# Patient Record
Sex: MALE | Race: WHITE | URBAN - METROPOLITAN AREA
[De-identification: names, ages, dates, MRNs, and addresses within clinical notes are randomized per-mention and may not be internally consistent; named-entity substitution may affect disease eponyms.]

---

## 2024-02-24 ENCOUNTER — OFFICE VISIT (OUTPATIENT)
Age: 54
End: 2024-02-24

## 2024-02-24 VITALS
SYSTOLIC BLOOD PRESSURE: 139 MMHG | HEART RATE: 79 BPM | HEIGHT: 68 IN | WEIGHT: 196.6 LBS | TEMPERATURE: 98 F | DIASTOLIC BLOOD PRESSURE: 95 MMHG | OXYGEN SATURATION: 97 % | BODY MASS INDEX: 29.8 KG/M2 | RESPIRATION RATE: 18 BRPM

## 2024-02-24 DIAGNOSIS — I10 ELEVATED BLOOD PRESSURE READING WITH DIAGNOSIS OF HYPERTENSION: ICD-10-CM

## 2024-02-24 DIAGNOSIS — J40 BRONCHITIS: Primary | ICD-10-CM

## 2024-02-24 DIAGNOSIS — R05.1 ACUTE COUGH: ICD-10-CM

## 2024-02-24 RX ORDER — PREDNISONE 20 MG/1
20 TABLET ORAL 2 TIMES DAILY
Qty: 10 TABLET | Refills: 0 | Status: SHIPPED | OUTPATIENT
Start: 2024-02-24 | End: 2024-02-29

## 2024-02-24 RX ORDER — LISINOPRIL 10 MG/1
10 TABLET ORAL DAILY
COMMUNITY

## 2024-02-24 RX ORDER — BENZONATATE 100 MG/1
100 CAPSULE ORAL 3 TIMES DAILY PRN
Qty: 30 CAPSULE | Refills: 0 | Status: SHIPPED | OUTPATIENT
Start: 2024-02-24

## 2024-02-24 RX ORDER — AZITHROMYCIN 250 MG/1
TABLET, FILM COATED ORAL
Qty: 6 TABLET | Refills: 0 | Status: SHIPPED | OUTPATIENT
Start: 2024-02-24 | End: 2024-03-05

## 2024-02-24 ASSESSMENT — ENCOUNTER SYMPTOMS: COUGH: 1

## 2024-02-24 NOTE — PATIENT INSTRUCTIONS
Keep hydrated, tylenol (if no contraindications) as needed if pain or fever.. Take medications as prescribed.. follow up in 7- days if not better  Return sooner  if symptoms worse/feeling worse or has new symptoms or concerns  Go to ER  if fever/chills, increase shortness of breath, increase congestion or wheezing despite medications, if associated with chest pain, nausea/vomiting, dizzy/ light-headed sensation.      Follow up BP check with  his PCP in 2 weeks

## 2024-02-24 NOTE — PROGRESS NOTES
does not appear acutely ill     Eyes:                 Pupil: equal-round-reactive to light, no photophobia, EOMI            Cornea: clear            Sclera: clear, non injected, non icteric    Ears: Right canal clear / TM normal           Left ear canal clear / TM normal  Nose/Sinus:  no nasal congestion/no drainage   Mouth:                 Mucous membranes moist               Oropharynx:  clear, no erythema, no exudates, voice normal  Neck:    supple, non tender,               No cervical lymph nodes   Pulmonary/Lungs:  effort normal, no stridor                                 Auscultation: good air movement / breath sounds diminished no whezzing/rhonchi..(smoker)  Cardio-vascular:  normal rate and rhythm                              Heart sounds normal-no murmur-rub   Abdomen:    soft .. Non tender,   Muscular skeleton:  motor strength normal / muscle tone normal                                  Extremities/joints: no tenderness, normal movements                                  Neurological:  no focal deficit  Skin: no rash   Psychiatric:   behavior appropriate--no confusion                              .  An electronic signature was used to authenticate this note.    --Ash Saucedo MD

## 2025-03-23 ENCOUNTER — OFFICE VISIT (OUTPATIENT)
Age: 55
End: 2025-03-23

## 2025-03-23 VITALS
OXYGEN SATURATION: 95 % | SYSTOLIC BLOOD PRESSURE: 138 MMHG | TEMPERATURE: 97.7 F | DIASTOLIC BLOOD PRESSURE: 97 MMHG | BODY MASS INDEX: 29.8 KG/M2 | HEART RATE: 68 BPM | WEIGHT: 196 LBS

## 2025-03-23 DIAGNOSIS — R42 DIZZINESS: Primary | ICD-10-CM

## 2025-03-23 RX ORDER — METOPROLOL SUCCINATE 25 MG/1
TABLET, EXTENDED RELEASE ORAL
COMMUNITY
Start: 2025-02-26

## 2025-03-23 RX ORDER — ATORVASTATIN CALCIUM 10 MG/1
TABLET, FILM COATED ORAL
COMMUNITY
Start: 2025-02-26